# Patient Record
Sex: MALE | Race: WHITE | NOT HISPANIC OR LATINO | URBAN - NONMETROPOLITAN AREA
[De-identification: names, ages, dates, MRNs, and addresses within clinical notes are randomized per-mention and may not be internally consistent; named-entity substitution may affect disease eponyms.]

---

## 2020-04-14 ENCOUNTER — TELEHEALTH PROVIDED OTHER THAN IN PATIENT'S HOME (OUTPATIENT)
Dept: URBAN - NONMETROPOLITAN AREA CLINIC 15 | Facility: CLINIC | Age: 25
End: 2020-04-14
Payer: COMMERCIAL

## 2020-04-14 VITALS — HEIGHT: 67 IN

## 2020-04-14 DIAGNOSIS — R19.7 DIARRHEA, UNSPECIFIED: ICD-10-CM

## 2020-04-14 DIAGNOSIS — K58.0 IRRITABLE BOWEL SYNDROME WITH DIARRHEA: ICD-10-CM

## 2020-04-14 DIAGNOSIS — R09.82 POSTNASAL DRIP: ICD-10-CM

## 2020-04-14 DIAGNOSIS — R63.4 ABNORMAL WEIGHT LOSS: ICD-10-CM

## 2020-04-14 PROCEDURE — 99213 OFFICE O/P EST LOW 20 MIN: CPT

## 2020-04-14 RX ORDER — ELUXADOLINE 100 MG/1
TABLET, FILM COATED ORAL
Qty: 90 | Refills: 3 | Status: COMPLETED
End: 2021-11-15

## 2020-04-14 RX ORDER — IPRATROPIUM BROMIDE 42 UG/1
84 SPRAY, METERED NASAL
Qty: 1 | Refills: 2 | Status: COMPLETED
Start: 2020-04-14 | End: 2021-11-15

## 2021-11-15 ENCOUNTER — TELEHEALTH PROVIDED OTHER THAN IN PATIENT'S HOME (OUTPATIENT)
Dept: URBAN - NONMETROPOLITAN AREA CLINIC 9 | Facility: CLINIC | Age: 26
End: 2021-11-15

## 2021-11-15 VITALS — HEIGHT: 67 IN

## 2021-11-15 DIAGNOSIS — R63.4 ABNORMAL WEIGHT LOSS: ICD-10-CM

## 2021-11-15 DIAGNOSIS — R10.33 PERIUMBILICAL PAIN: ICD-10-CM

## 2021-11-15 DIAGNOSIS — K58.0 IRRITABLE BOWEL SYNDROME WITH DIARRHEA: ICD-10-CM

## 2021-11-15 PROCEDURE — 99214 OFFICE O/P EST MOD 30 MIN: CPT

## 2021-11-15 NOTE — SERVICENOTES
This visit was completed via ZOOM due to the restrictions of the COVID-19 pandemic. All issues as below were discussed and addressed.  Patient verbally consented to visit. exam was performed with the assistance Eri Watt LPN , who was present in the exam room with patient
Start time:12:00
End time:12:15

## 2021-11-15 NOTE — SERVICEHPINOTES
26-year-old  male with history of diarrhea predominant irritable bowel syndrome who has a telemedicine visit today for recurrent symptoms of abdominal pain and diarrhea.  He reports the symptoms are similar to the episodes in the past.  He has been evaluated with colonoscopy in the past did not show any evidence of IBD or microscopic colitis.  He had responded Viberzi and p.r.n. Levsin in the past.  He would like refills on the medication.  He is currently living in [place].  He recently moved there from [place].    he reports 8 lb weight loss due to food avoidance secondary to not wanting symptoms of abdominal pain and cramping, diarrhea.  He denies any bright red blood per rectum or melena.  He denies any dysphagia or odynophagia.This patient is being seen today via Telemedicine, is aware of the limitations surrounding this treatment modality, and expresses a willingness to proceed. Telemedicine is utilized at this time during the COVID-19 quarantine/pandemic.